# Patient Record
Sex: FEMALE | Race: AMERICAN INDIAN OR ALASKA NATIVE | NOT HISPANIC OR LATINO | Employment: UNEMPLOYED | URBAN - METROPOLITAN AREA
[De-identification: names, ages, dates, MRNs, and addresses within clinical notes are randomized per-mention and may not be internally consistent; named-entity substitution may affect disease eponyms.]

---

## 2018-10-16 PROBLEM — N93.9 ABNORMAL UTERINE BLEEDING (AUB): Status: ACTIVE | Noted: 2018-10-16

## 2018-10-28 PROBLEM — N93.9 ABNORMAL UTERINE BLEEDING (AUB): Chronic | Status: ACTIVE | Noted: 2018-10-16

## 2018-10-28 PROBLEM — F17.200 SMOKER: Chronic | Status: ACTIVE | Noted: 2018-10-28

## 2018-10-28 PROBLEM — N39.0 E. COLI UTI: Status: ACTIVE | Noted: 2018-10-28

## 2018-10-28 PROBLEM — B96.20 E. COLI UTI: Status: ACTIVE | Noted: 2018-10-28

## 2018-10-28 PROBLEM — F12.90 MARIJUANA USER: Status: ACTIVE | Noted: 2018-10-28

## 2018-10-29 PROBLEM — Z98.890 S/P DILATION AND CURETTAGE: Status: ACTIVE | Noted: 2018-10-29

## 2019-01-02 PROBLEM — N85.01 SIMPLE ENDOMETRIAL HYPERPLASIA: Status: ACTIVE | Noted: 2019-01-02

## 2019-01-07 PROBLEM — Z23 FLU VACCINE NEED: Status: ACTIVE | Noted: 2019-01-07

## 2019-01-07 PROBLEM — Z90.710 S/P HYSTERECTOMY: Status: ACTIVE | Noted: 2019-01-07

## 2021-01-19 ENCOUNTER — OFFICE VISIT (OUTPATIENT)
Dept: URGENT CARE | Facility: CLINIC | Age: 36
End: 2021-01-19
Payer: MEDICAID

## 2021-01-19 VITALS
SYSTOLIC BLOOD PRESSURE: 118 MMHG | HEART RATE: 80 BPM | DIASTOLIC BLOOD PRESSURE: 66 MMHG | WEIGHT: 130 LBS | OXYGEN SATURATION: 100 % | RESPIRATION RATE: 16 BRPM | HEIGHT: 61 IN | TEMPERATURE: 97 F | BODY MASS INDEX: 24.55 KG/M2

## 2021-01-19 DIAGNOSIS — R30.0 DYSURIA: ICD-10-CM

## 2021-01-19 DIAGNOSIS — N30.01 ACUTE CYSTITIS WITH HEMATURIA: Primary | ICD-10-CM

## 2021-01-19 LAB
BILIRUB UR QL STRIP: NEGATIVE
GLUCOSE UR QL STRIP: NEGATIVE
KETONES UR QL STRIP: NEGATIVE
LEUKOCYTE ESTERASE UR QL STRIP: POSITIVE
PH, POC UA: 7 (ref 5–8)
POC BLOOD, URINE: POSITIVE
POC NITRATES, URINE: NEGATIVE
PROT UR QL STRIP: NEGATIVE
SP GR UR STRIP: 1 (ref 1–1.03)
UROBILINOGEN UR STRIP-ACNC: NORMAL (ref 0.1–1.1)

## 2021-01-19 PROCEDURE — 99213 PR OFFICE/OUTPT VISIT, EST, LEVL III, 20-29 MIN: ICD-10-PCS | Mod: 25,S$GLB,, | Performed by: NURSE PRACTITIONER

## 2021-01-19 PROCEDURE — 99213 OFFICE O/P EST LOW 20 MIN: CPT | Mod: 25,S$GLB,, | Performed by: NURSE PRACTITIONER

## 2021-01-19 PROCEDURE — 81003 URINALYSIS AUTO W/O SCOPE: CPT | Mod: QW,S$GLB,, | Performed by: NURSE PRACTITIONER

## 2021-01-19 PROCEDURE — 81003 POCT URINALYSIS, DIPSTICK, AUTOMATED, W/O SCOPE: ICD-10-PCS | Mod: QW,S$GLB,, | Performed by: NURSE PRACTITIONER

## 2021-01-19 RX ORDER — NITROFURANTOIN 25; 75 MG/1; MG/1
100 CAPSULE ORAL 2 TIMES DAILY
Qty: 10 CAPSULE | Refills: 0 | Status: SHIPPED | OUTPATIENT
Start: 2021-01-19 | End: 2021-01-24

## 2021-05-04 ENCOUNTER — PATIENT MESSAGE (OUTPATIENT)
Dept: RESEARCH | Facility: HOSPITAL | Age: 36
End: 2021-05-04

## 2023-12-21 ENCOUNTER — ON-DEMAND VIRTUAL (OUTPATIENT)
Dept: URGENT CARE | Facility: CLINIC | Age: 38
End: 2023-12-21
Payer: MEDICAID

## 2023-12-21 DIAGNOSIS — J02.9 PHARYNGITIS, UNSPECIFIED ETIOLOGY: Primary | ICD-10-CM

## 2023-12-21 PROCEDURE — 99213 OFFICE O/P EST LOW 20 MIN: CPT | Mod: 95,,, | Performed by: PHYSICIAN ASSISTANT

## 2023-12-21 PROCEDURE — 99213 PR OFFICE/OUTPT VISIT, EST, LEVL III, 20-29 MIN: ICD-10-PCS | Mod: 95,,, | Performed by: PHYSICIAN ASSISTANT

## 2023-12-21 RX ORDER — PENICILLIN V POTASSIUM 500 MG/1
500 TABLET, FILM COATED ORAL 2 TIMES DAILY
Qty: 20 TABLET | Refills: 0 | Status: SHIPPED | OUTPATIENT
Start: 2023-12-21 | End: 2023-12-31

## 2023-12-22 NOTE — PROGRESS NOTES
Subjective:      Patient ID: Mima Wilkerson is a 38 y.o. female.    Vitals:  vitals were not taken for this visit.     Chief Complaint: Headache      Visit Type: TELE AUDIOVISUAL    Present with the patient at the time of consultation: TELEMED PRESENT WITH PATIENT: None    Past Medical History:   Diagnosis Date    Abnormal uterine bleeding (AUB)     E. coli UTI 10/28/2018    PONV (postoperative nausea and vomiting)      Past Surgical History:   Procedure Laterality Date    CERVICAL BIOPSY N/A 10/29/2018    Procedure: BIOPSY, CERVIX;  Surgeon: Laila Leiva MD;  Location: [facility];  Service: OB/GYN;  Laterality: N/A;  ECC    CERVICAL BIOPSY  W/ LOOP ELECTRODE EXCISION      CHOLECYSTECTOMY      COLPOSCOPY N/A 10/29/2018    Procedure: COLPOSCOPY;  Surgeon: Laila Leiva MD;  Location: [facility];  Service: OB/GYN;  Laterality: N/A;    CYSTOSCOPY N/A 01/07/2019    Procedure: CYSTOSCOPY;  Surgeon: Christiano Barkley MD;  Location: [facility];  Service: OB/GYN;  Laterality: N/A;    HYSTERECTOMY      HYSTEROSCOPY WITH DILATION AND CURETTAGE OF UTERUS N/A 10/29/2018    Procedure: HYSTEROSCOPY, WITH DILATION AND CURETTAGE OF UTERUS;  Surgeon: Laila Leiva MD;  Location: [facility];  Service: OB/GYN;  Laterality: N/A;    REYES CULDOPLASTY N/A 01/07/2019    Procedure: CULDOPLASTYAMY;  Surgeon: Christiano Barkley MD;  Location: [facility];  Service: OB/GYN;  Laterality: N/A;    SALPINGECTOMY Right 01/07/2019    Procedure: SALPINGECTOMY;  Surgeon: Christiano Barkley MD;  Location: [facility];  Service: OB/GYN;  Laterality: Right;  (Partial)  FIMBRIECTOMY    TONSILLECTOMY      TUBAL LIGATION      VAGINAL HYSTERECTOMY N/A 01/07/2019    Procedure: HYSTERECTOMY, VAGINAL;  Surgeon: Christiano Barkley MD;  Location: [facility];  Service: OB/GYN;  Laterality: N/A;     Review of patient's allergies indicates:   Allergen Reactions    Oxycontin [oxycodone] Swelling     Current Outpatient Medications on File Prior to Visit   Medication Sig  Dispense Refill    albuterol (PROVENTIL/VENTOLIN HFA) 90 mcg/actuation inhaler Inhale 1-2 puffs into the lungs every 6 (six) hours as needed for Wheezing or Shortness of Breath. (Patient not taking: Reported on 6/28/2023) 18 g 0    cholecalciferol, vitamin D3, 1,250 mcg (50,000 unit) capsule Take 50,000 Units by mouth every 7 days.      famotidine (PEPCID) 40 MG tablet Take 40 mg by mouth.      fluticasone propionate (FLONASE) 50 mcg/actuation nasal spray 1 spray to each nostril twice daily for 7 days, then may continue 1 spray to each nostril ONCE daily as needed for sinus congestion. (Patient not taking: Reported on 6/28/2023) 16 g 0    hydrOXYzine pamoate (VISTARIL) 25 MG Cap Take 1 capsule (25 mg total) by mouth every 6 (six) hours as needed (anxiety). 12 capsule 0    pantoprazole (PROTONIX) 20 MG tablet Take 1 tablet (20 mg total) by mouth once daily. 30 tablet 0     No current facility-administered medications on file prior to visit.     Family History   Problem Relation Age of Onset    Breast cancer Mother     Hypertension Father     Tongue cancer Father        Medications Ordered                [id number] - [place] - [address] AT [place] [address] [address] [address]    Telephone: [phone]   Fax: [phone]   Hours: Not open 24 hours                         E-Prescribed (1 of 1)              penicillin v potassium (VEETID) 500 MG tablet    Sig: Take 1 tablet (500 mg total) by mouth 2 (two) times daily. for 10 days       Start: 12/21/23     Quantity: 20 tablet Refills: 0                           Ohs Peq Odvv Intake    12/21/2023  8:13 PM CST - Filed by Patient   Describe your reason for todays visit Im taking theraflu but my head hurts bad and I'm really tired   What is your current physical address in the event of a medical emergency? [address] [id number]   Are you able to take your vital signs? No   Please attach any relevant images or files           HPI  37yo female presents with c/o headache, tired x 2-3 days, now with sore throat, fevers, chills, slight cough and nasal drainage.  Two days ago went to  with her children. She had negative flu, strep and covid at that time but one of her children had strep and the other had flu.     Denies POP.         Constitution: Positive for activity change, appetite change, chills, fatigue and fever.   HENT:  Positive for sore throat. Negative for ear pain and trouble swallowing.    Respiratory:  Positive for cough. Negative for chest tightness, shortness of breath and wheezing.    Gastrointestinal:  Negative for abdominal pain, nausea, vomiting and diarrhea.   Skin:  Negative for rash.   Neurological:  Positive for headaches.        Objective:   The physical exam was conducted virtually.  Physical Exam   Constitutional: She is oriented to person, place, and time.  Non-toxic appearance. She does not appear ill. No distress.   HENT:   Head: Normocephalic and atraumatic.   Mouth/Throat: Uvula is midline and mucous membranes are normal. Mucous membranes are moist. No trismus in the jaw. No uvula swelling. Oropharyngeal exudate and posterior oropharyngeal erythema present. No posterior oropharyngeal edema.   Neck: Neck supple.   Pulmonary/Chest: Effort normal. No respiratory distress.   Abdominal: Normal appearance.   Lymphadenopathy:     She has cervical adenopathy.   Neurological: She is alert and oriented to person, place, and time. Coordination normal.   Skin: Skin is dry, not diaphoretic, not pale and no rash.   Psychiatric: Her behavior is normal. Judgment and thought content normal.       Assessment:     1. Pharyngitis, unspecified etiology        Plan:       Pharyngitis, unspecified etiology    Other orders  -     penicillin v potassium (VEETID) 500 MG tablet; Take 1 tablet (500 mg total) by mouth 2 (two) times daily. for 10 days  Dispense: 20 tablet; Refill: 0      1. Push fluids and rest. Recommend warm salt  gargles or tea with honey for throat discomfort. May take Tylenol or Ibuprofen for fever or pain control. Antibiotic has been sent to your pharmacy, please take as directed.  2. If no improvement in 2-3 days please follow up at local urgent care for further evaluation or sooner if worsening pain, trouble or difficulty swallowing, unable to open mouth or other concerns.  3.  You must understand that you've received a Telehealth Urgent Care treatment only and that you may be released before all your medical problems are known or treated. You, the patient, will arrange for follow up care as instructed.    Patient voiced understanding and agrees to plan.

## 2023-12-29 ENCOUNTER — ON-DEMAND VIRTUAL (OUTPATIENT)
Dept: URGENT CARE | Facility: CLINIC | Age: 38
End: 2023-12-29
Payer: MEDICAID

## 2023-12-29 DIAGNOSIS — R05.1 ACUTE COUGH: Primary | ICD-10-CM

## 2023-12-29 DIAGNOSIS — B37.9 ANTIBIOTIC-INDUCED YEAST INFECTION: ICD-10-CM

## 2023-12-29 DIAGNOSIS — T36.95XA ANTIBIOTIC-INDUCED YEAST INFECTION: ICD-10-CM

## 2023-12-29 PROCEDURE — 99213 PR OFFICE/OUTPT VISIT, EST, LEVL III, 20-29 MIN: ICD-10-PCS | Mod: 95,,, | Performed by: NURSE PRACTITIONER

## 2023-12-29 PROCEDURE — 99213 OFFICE O/P EST LOW 20 MIN: CPT | Mod: 95,,, | Performed by: NURSE PRACTITIONER

## 2023-12-29 RX ORDER — FLUCONAZOLE 150 MG/1
150 TABLET ORAL DAILY
Qty: 2 TABLET | Refills: 0 | Status: SHIPPED | OUTPATIENT
Start: 2023-12-29 | End: 2023-12-31

## 2023-12-29 RX ORDER — BENZONATATE 100 MG/1
100 CAPSULE ORAL 3 TIMES DAILY PRN
Qty: 60 CAPSULE | Refills: 0 | Status: SHIPPED | OUTPATIENT
Start: 2023-12-29 | End: 2024-01-18

## 2023-12-29 NOTE — PROGRESS NOTES
Subjective:      Patient ID: Mima Wilkerson is a 38 y.o. female.    Vitals:  vitals were not taken for this visit.     Chief Complaint: Cough      Visit Type: TELE AUDIOVISUAL    Present with the patient at the time of consultation: TELEMED PRESENT WITH PATIENT: None    Past Medical History:   Diagnosis Date    Abnormal uterine bleeding (AUB)     E. coli UTI 10/28/2018    PONV (postoperative nausea and vomiting)      Past Surgical History:   Procedure Laterality Date    CERVICAL BIOPSY N/A 10/29/2018    Procedure: BIOPSY, CERVIX;  Surgeon: Laila Leiva MD;  Location: [facility];  Service: OB/GYN;  Laterality: N/A;  ECC    CERVICAL BIOPSY  W/ LOOP ELECTRODE EXCISION      CHOLECYSTECTOMY      COLPOSCOPY N/A 10/29/2018    Procedure: COLPOSCOPY;  Surgeon: Laila Leiva MD;  Location: [facility];  Service: OB/GYN;  Laterality: N/A;    CYSTOSCOPY N/A 01/07/2019    Procedure: CYSTOSCOPY;  Surgeon: Christiano Barkley MD;  Location: [facility];  Service: OB/GYN;  Laterality: N/A;    HYSTERECTOMY      HYSTEROSCOPY WITH DILATION AND CURETTAGE OF UTERUS N/A 10/29/2018    Procedure: HYSTEROSCOPY, WITH DILATION AND CURETTAGE OF UTERUS;  Surgeon: Laila Leiva MD;  Location: [facility];  Service: OB/GYN;  Laterality: N/A;    REYES CULDOPLASTY N/A 01/07/2019    Procedure: CULDOPLASTY, AMY;  Surgeon: Christiano Barkley MD;  Location: [facility];  Service: OB/GYN;  Laterality: N/A;    SALPINGECTOMY Right 01/07/2019    Procedure: SALPINGECTOMY;  Surgeon: Christiano Barkley MD;  Location: [facility];  Service: OB/GYN;  Laterality: Right;  (Partial)  FIMBRIECTOMY    TONSILLECTOMY      TUBAL LIGATION      VAGINAL HYSTERECTOMY N/A 01/07/2019    Procedure: HYSTERECTOMY, VAGINAL;  Surgeon: Chirstiano Barkley MD;  Location: [facility];  Service: OB/GYN;  Laterality: N/A;     Review of patient's allergies indicates:   Allergen Reactions    Oxycontin [oxycodone] Swelling     Current Outpatient Medications on File Prior to Visit   Medication Sig Dispense  Refill    famotidine (PEPCID) 40 MG tablet Take 40 mg by mouth.      penicillin v potassium (VEETID) 500 MG tablet Take 1 tablet (500 mg total) by mouth 2 (two) times daily. for 10 days 20 tablet 0     No current facility-administered medications on file prior to visit.     Family History   Problem Relation Age of Onset    Breast cancer Mother     Hypertension Father     Tongue cancer Father        Medications Ordered                [id number] - [place] - [address] AT [address] [place]   [address] [place] [address]    Telephone: [phone]   Fax: [phone]   Hours: Not open 24 hours                         E-Prescribed (2 of 2)              benzonatate (TESSALON) 100 MG capsule    Sig: Take 1 capsule (100 mg total) by mouth 3 (three) times daily as needed for Cough. May take 1-2 caps 3 times daily as needed.       Start: 12/29/23     Quantity: 60 capsule Refills: 0                         fluconazole (DIFLUCAN) 150 MG Tab    Sig: Take 1 tablet (150 mg total) by mouth once daily. Take 1 tablet as a single dose. If symptoms persist, may repeat a second dose in 72 hours. for 2 days       Start: 12/29/23     Quantity: 2 tablet Refills: 0                           Ohs Peq Odvv Intake    12/29/2023 10:18 AM CST - Filed by Patient   Describe your reason for todays visit I started with a cough yesterday and I'm already on antibiotics   What is your current physical address in the event of a medical emergency? [address] [facility] [address]   Are you able to take your vital signs? Yes   Systolic Blood Pressure: 112   Diastolic Blood Pressure: 71   Weight: 140   Height: 5   Pulse: 100   Temperature:    Respiration rate:    Pulse Oxygen:    Please attach any relevant images or files          +sick contacts at home. Seen last week and given antibiotics, still taking. Yesterday started with cough and has now developed yeast symptoms.    Cough  Pertinent negatives include no shortness of breath or  wheezing.       Respiratory:  Positive for cough and sputum production. Negative for shortness of breath and wheezing.    Genitourinary:  Negative for vaginal pain, vaginal discharge, vaginal odor and genital sore.   Allergic/Immunologic: Positive for itching (vaginal).        Objective:   The physical exam was conducted virtually.  Physical Exam   Constitutional: She is oriented to person, place, and time. She does not appear ill. No distress.   HENT:   Head: Normocephalic and atraumatic.   Nose: Nose normal.   Eyes: Extraocular movement intact   Pulmonary/Chest: Effort normal.   Abdominal: Normal appearance.   Musculoskeletal: Normal range of motion.         General: Normal range of motion.   Neurological: no focal deficit. She is alert and oriented to person, place, and time.   Psychiatric: Her behavior is normal. Mood normal.   Vitals reviewed.      Assessment:     1. Acute cough    2. Antibiotic-induced yeast infection        Plan:   Patient encouraged to monitor symptoms closely and instructed to follow-up for new or worsening symptoms. Further, in-person, evaluation may be necessary for continued treatment. Please follow up with your primary care doctor or specialist as needed. Verbally discussed plan. Patient confirms understanding and is in agreement with treatment and plan.     You must understand that you've received a Virtual Care evaluation only and that you may be released before all your medical problems are known or treated. You, the patient, will arrange for follow up care as instructed.      Acute cough  -     benzonatate (TESSALON) 100 MG capsule; Take 1 capsule (100 mg total) by mouth 3 (three) times daily as needed for Cough. May take 1-2 caps 3 times daily as needed.  Dispense: 60 capsule; Refill: 0    Antibiotic-induced yeast infection  -     fluconazole (DIFLUCAN) 150 MG Tab; Take 1 tablet (150 mg total) by mouth once daily. Take 1 tablet as a single dose. If symptoms persist, may repeat a  second dose in 72 hours. for 2 days  Dispense: 2 tablet; Refill: 0             Patient Instructions   OVER THE COUNTER RECOMMENDATIONS/SUGGESTIONS (IF NO CONTRAINDICATIONS).     ·         Make sure to stay well hydrated.     ·         Use Nasal Saline to mechanically move any post nasal drip from your eustachian tube or from the back of your throat.     ·         Use warm saltwater gargles to ease your throat pain. Warm saltwater gargles as needed for sore throat-  1/2 tsp salt to 1 cup warm water, gargle as desired. Warm fluids tend to relieve a sore throat.     .         Throat lozenges, Chloraseptic spray or other over the counter treatments are ok to use as well. Use as directed.     ·         Use an antihistamine such as Claritin, Zyrtec or Allegra to dry you out.     ·         Use pseudoephedrine (behind the counter) to decongest. Pseudoephedrine  30 mg up to 240 mg /day. It can raise your blood pressure and give you palpitations.     ·         Use Mucinex (guaifenesin) to break up mucous up to 2400mg/day to loosen any mucous.     ·         The Mucinex DM pill has a cough suppressant that can be sedating. It can be used at night to stop the tickle at the back of your throat.     ·         You can use Mucinex D (it has guaifenesin and a high dose of pseudoephedrine) in the mornings to help decongest.     ·         Use Afrin (oxymetazoline) in each nare for no longer than 3 days, as it is addictive. It can also dry out your mucous membranes and cause elevated blood pressure. This is especially useful if you are flying.     ·         Use Flonase 1-2 sprays/nostril per day. It is a local acting steroid nasal spray, if you develop a bloody nose, stop using the medication immediately.     ·         Sometimes Nyquil at night is beneficial to help you get some rest, however it is sedating, and it does have an antihistamine, and Tylenol.     ·         Honey is a natural cough suppressant that can be used.     ·          Tylenol up to 4,000 mg a day is safe for short periods and can be used for body aches, pain, and fever. However, in high doses and prolonged use it can cause liver irritation.     ·         Ibuprofen is a non-steroidal anti-inflammatory that can be used for body aches, pain, and fever. However, it can also cause stomach irritation if overused.

## 2024-01-18 ENCOUNTER — ON-DEMAND VIRTUAL (OUTPATIENT)
Dept: URGENT CARE | Facility: CLINIC | Age: 39
End: 2024-01-18
Payer: MEDICAID

## 2024-01-18 DIAGNOSIS — N30.90 CYSTITIS: Primary | ICD-10-CM

## 2024-01-18 PROCEDURE — 99212 OFFICE O/P EST SF 10 MIN: CPT | Mod: 95,,, | Performed by: INTERNAL MEDICINE

## 2024-01-18 RX ORDER — NITROFURANTOIN 25; 75 MG/1; MG/1
100 CAPSULE ORAL 2 TIMES DAILY
Qty: 10 CAPSULE | Refills: 0 | Status: SHIPPED | OUTPATIENT
Start: 2024-01-18 | End: 2024-01-23

## 2024-01-18 NOTE — PROGRESS NOTES
Subjective:      Patient ID: Mima Wilkerson is a 38 y.o. female.    Vitals:  vitals were not taken for this visit.     Chief Complaint: Urinary Tract Infection      Visit Type: TELE AUDIOVISUAL    Present with the patient at the time of consultation: TELEMED PRESENT WITH PATIENT: None    Past Medical History:   Diagnosis Date    Abnormal uterine bleeding (AUB)     E. coli UTI 10/28/2018    PONV (postoperative nausea and vomiting)      Past Surgical History:   Procedure Laterality Date    CERVICAL BIOPSY N/A 10/29/2018    Procedure: BIOPSY, CERVIX;  Surgeon: Laila Leiva MD;  Location: [facility];  Service: OB/GYN;  Laterality: N/A;  ECC    CERVICAL BIOPSY  W/ LOOP ELECTRODE EXCISION      CHOLECYSTECTOMY      COLPOSCOPY N/A 10/29/2018    Procedure: COLPOSCOPY;  Surgeon: Laila Leiva MD;  Location: [facility];  Service: OB/GYN;  Laterality: N/A;    CYSTOSCOPY N/A 01/07/2019    Procedure: CYSTOSCOPY;  Surgeon: Christiano Barkley MD;  Location: [facility];  Service: OB/GYN;  Laterality: N/A;    HYSTERECTOMY      HYSTEROSCOPY WITH DILATION AND CURETTAGE OF UTERUS N/A 10/29/2018    Procedure: HYSTEROSCOPY, WITH DILATION AND CURETTAGE OF UTERUS;  Surgeon: Laila Leiva MD;  Location: [facility];  Service: OB/GYN;  Laterality: N/A;    REYES CULDOPLASTY N/A 01/07/2019    Procedure: CULDOPLASTY, AMY;  Surgeon: Christiano Barkley MD;  Location: [facility];  Service: OB/GYN;  Laterality: N/A;    SALPINGECTOMY Right 01/07/2019    Procedure: SALPINGECTOMY;  Surgeon: Christiano Barkley MD;  Location: [facility];  Service: OB/GYN;  Laterality: Right;  (Partial)  FIMBRIECTOMY    TONSILLECTOMY      TUBAL LIGATION      VAGINAL HYSTERECTOMY N/A 01/07/2019    Procedure: HYSTERECTOMY, VAGINAL;  Surgeon: Christiano Barkley MD;  Location: [facility];  Service: OB/GYN;  Laterality: N/A;     Review of patient's allergies indicates:   Allergen Reactions    Oxycontin [oxycodone] Swelling     Current Outpatient Medications on File Prior to Visit    Medication Sig Dispense Refill    benzonatate (TESSALON) 100 MG capsule Take 1 capsule (100 mg total) by mouth 3 (three) times daily as needed for Cough. May take 1-2 caps 3 times daily as needed. 60 capsule 0    famotidine (PEPCID) 40 MG tablet Take 40 mg by mouth.       No current facility-administered medications on file prior to visit.     Family History   Problem Relation Age of Onset    Breast cancer Mother     Hypertension Father     Tongue cancer Father        Medications Ordered                [id number] - [place] - [address] AT [facility]   [address] [place] [address]    Telephone: [phone]   Fax: [phone]   Hours: Not open 24 hours                         E-Prescribed (1 of 1)              nitrofurantoin, macrocrystal-monohydrate, (MACROBID) 100 MG capsule    Sig: Take 1 capsule (100 mg total) by mouth 2 (two) times daily. for 5 days       Start: 1/18/24     Quantity: 10 capsule Refills: 0                           Ohs Peq Odvv Intake    1/18/2024  2:38 PM CST - Filed by Patient   What is your current physical address in the event of a medical emergency? [address] [facility].[id number]   Are you able to take your vital signs? No   Please attach any relevant images or files          In [place] via video conference.     39 y/o woman with a 1 day history of urgency and feeling that she cannot empty her urine all the way. No fever. No frequency. She is not allergic to any antibiotics.     Urinary Tract Infection   Associated symptoms include urgency. Pertinent negatives include no flank pain, nausea or vomiting.       Constitution: Negative for fever.   Gastrointestinal:  Negative for nausea and vomiting.   Genitourinary:  Positive for urgency. Negative for flank pain.        Objective:   The physical exam was conducted virtually.  Physical Exam   Constitutional:  Non-toxic appearance. No distress.   HENT:   Nose: No congestion.   Pulmonary/Chest: No stridor. No  respiratory distress.   Neurological: She is alert.     No other pertinent findings on examination.   Assessment:     1. Cystitis        Plan:       Cystitis    Other orders  -     nitrofurantoin, macrocrystal-monohydrate, (MACROBID) 100 MG capsule; Take 1 capsule (100 mg total) by mouth 2 (two) times daily. for 5 days  Dispense: 10 capsule; Refill: 0      If no better in a day or two go in for an in person visit.

## 2024-02-06 ENCOUNTER — PATIENT MESSAGE (OUTPATIENT)
Dept: URGENT CARE | Facility: CLINIC | Age: 39
End: 2024-02-06
Payer: MEDICAID

## 2024-03-22 ENCOUNTER — ON-DEMAND VIRTUAL (OUTPATIENT)
Dept: URGENT CARE | Facility: CLINIC | Age: 39
End: 2024-03-22
Payer: MEDICAID

## 2024-03-22 DIAGNOSIS — J02.9 ACUTE PHARYNGITIS, UNSPECIFIED ETIOLOGY: Primary | ICD-10-CM

## 2024-03-22 PROCEDURE — 99212 OFFICE O/P EST SF 10 MIN: CPT | Mod: 95,,, | Performed by: NURSE PRACTITIONER

## 2024-03-22 NOTE — PROGRESS NOTES
Subjective:      Patient ID: Mima Wilkerson is a 39 y.o. female.    Vitals:  vitals were not taken for this visit.     Chief Complaint: Sore Throat      Visit Type: TELE AUDIOVISUAL    Present with the patient at the time of consultation: TELEMED PRESENT WITH PATIENT: None, at home    Past Medical History:   Diagnosis Date    Abnormal uterine bleeding (AUB)     E. coli UTI 10/28/2018    PONV (postoperative nausea and vomiting)      Past Surgical History:   Procedure Laterality Date    CERVICAL BIOPSY N/A 10/29/2018    Procedure: BIOPSY, CERVIX;  Surgeon: Laila Leiva MD;  Location: [facility];  Service: OB/GYN;  Laterality: N/A;  ECC    CERVICAL BIOPSY  W/ LOOP ELECTRODE EXCISION      CHOLECYSTECTOMY      COLPOSCOPY N/A 10/29/2018    Procedure: COLPOSCOPY;  Surgeon: Laila Leiva MD;  Location: [facility];  Service: OB/GYN;  Laterality: N/A;    CYSTOSCOPY N/A 01/07/2019    Procedure: CYSTOSCOPY;  Surgeon: Christiano Barkley MD;  Location: [facility];  Service: OB/GYN;  Laterality: N/A;    HYSTERECTOMY      HYSTEROSCOPY WITH DILATION AND CURETTAGE OF UTERUS N/A 10/29/2018    Procedure: HYSTEROSCOPY, WITH DILATION AND CURETTAGE OF UTERUS;  Surgeon: Laila Leiva MD;  Location: [facility];  Service: OB/GYN;  Laterality: N/A;    REYES CULDOPLASTY N/A 01/07/2019    Procedure: CULDOPLASTY, AMY;  Surgeon: Christiano Barkley MD;  Location: [facility];  Service: OB/GYN;  Laterality: N/A;    SALPINGECTOMY Right 01/07/2019    Procedure: SALPINGECTOMY;  Surgeon: Christiano Barkley MD;  Location: [facility];  Service: OB/GYN;  Laterality: Right;  (Partial)  FIMBRIECTOMY    TONSILLECTOMY      TUBAL LIGATION      VAGINAL HYSTERECTOMY N/A 01/07/2019    Procedure: HYSTERECTOMY, VAGINAL;  Surgeon: Christiano Barkley MD;  Location: [facility];  Service: OB/GYN;  Laterality: N/A;     Review of patient's allergies indicates:   Allergen Reactions    Oxycodone-acetaminophen     Oxycontin [oxycodone] Swelling     Current Outpatient Medications on File  Prior to Visit   Medication Sig Dispense Refill    famotidine (PEPCID) 40 MG tablet Take 40 mg by mouth.       No current facility-administered medications on file prior to visit.     Family History   Problem Relation Age of Onset    Breast cancer Mother     Hypertension Father     Tongue cancer Father            Ohs Peq Odvv Intake    3/22/2024  9:58 AM CDT - Filed by Patient   What is your current physical address in the event of a medical emergency? [address] [facility].[id number]   Are you able to take your vital signs? No   Please attach any relevant images or files          Sore throat for 3 days. Took allergy medications with little relief. No sick contacts.    Sore Throat   Associated symptoms include coughing, ear pain and shortness of breath (+smoker). Pertinent negatives include no trouble swallowing.       Constitution: Negative for chills and fever.   HENT:  Positive for ear pain, postnasal drip, sinus pressure and sore throat. Negative for sinus pain, trouble swallowing and voice change.    Neck: Positive for painful lymph nodes (right).   Respiratory:  Positive for cough and shortness of breath (+smoker). Negative for wheezing.    Allergic/Immunologic: Positive for seasonal allergies.   Hematologic/Lymphatic: Positive for swollen lymph nodes (right).        Objective:   The physical exam was conducted virtually.  Physical Exam   Constitutional: She is oriented to person, place, and time. She does not appear ill. No distress.   HENT:   Head: Normocephalic and atraumatic.   Nose: Nose normal.   Eyes: Extraocular movement intact   Pulmonary/Chest: Effort normal.   Abdominal: Normal appearance.   Musculoskeletal: Normal range of motion.         General: Normal range of motion.   Neurological: no focal deficit. She is alert and oriented to person, place, and time.   Psychiatric: Her behavior is normal. Mood normal.   Vitals reviewed.      Assessment:     1. Acute pharyngitis, unspecified etiology         Plan:   Patient encouraged to monitor symptoms closely and instructed to follow-up for new or worsening symptoms. Further, in-person, evaluation may be necessary for continued treatment. Please follow up with your primary care doctor or specialist as needed. Verbally discussed plan. Patient confirms understanding and is in agreement with treatment and plan.     You must understand that you've received a [facility] evaluation only and that you may be released before all your medical problems are known or treated. You, the patient, will arrange for follow up care as instructed.      Acute pharyngitis, unspecified etiology        Patient Instructions   OVER THE COUNTER RECOMMENDATIONS/SUGGESTIONS (IF NO CONTRAINDICATIONS).     ·         Make sure to stay well hydrated.     ·         Use Nasal Saline to mechanically move any post nasal drip from your eustachian tube or from the back of your throat.     ·         Use warm saltwater gargles to ease your throat pain. Warm saltwater gargles as needed for sore throat-  1/2 tsp salt to 1 cup warm water, gargle as desired. Warm fluids tend to relieve a sore throat.     .         Throat lozenges, Chloraseptic spray or other over the counter treatments are ok to use as well. Use as directed.     ·         Use an antihistamine such as Claritin, Zyrtec or Allegra to dry you out.     ·         Use pseudoephedrine (behind the counter) to decongest. Pseudoephedrine  30 mg up to 240 mg /day. It can raise your blood pressure and give you palpitations.     ·         Use Mucinex (guaifenesin) to break up mucous up to 2400mg/day to loosen any mucous.     ·         The Mucinex DM pill has a cough suppressant that can be sedating. It can be used at night to stop the tickle at the back of your throat.     ·         You can use Mucinex D (it has guaifenesin and a high dose of pseudoephedrine) in the mornings to help decongest.     ·         Use Afrin (oxymetazoline) in each nare for  no longer than 3 days, as it is addictive. It can also dry out your mucous membranes and cause elevated blood pressure. This is especially useful if you are flying.     ·         Use Flonase 1-2 sprays/nostril per day. It is a local acting steroid nasal spray, if you develop a bloody nose, stop using the medication immediately.     ·         Sometimes Nyquil at night is beneficial to help you get some rest, however it is sedating, and it does have an antihistamine, and Tylenol.     ·         Honey is a natural cough suppressant that can be used.     ·         Tylenol up to 4,000 mg a day is safe for short periods and can be used for body aches, pain, and fever. However, in high doses and prolonged use it can cause liver irritation.     ·         Ibuprofen is a non-steroidal anti-inflammatory that can be used for body aches, pain, and fever. However, it can also cause stomach irritation if overused.

## 2024-05-31 ENCOUNTER — ON-DEMAND VIRTUAL (OUTPATIENT)
Dept: URGENT CARE | Facility: CLINIC | Age: 39
End: 2024-05-31
Payer: MEDICAID

## 2024-05-31 DIAGNOSIS — T63.481A INSECT STINGS, ACCIDENTAL OR UNINTENTIONAL, INITIAL ENCOUNTER: Primary | ICD-10-CM

## 2024-05-31 PROCEDURE — 99213 OFFICE O/P EST LOW 20 MIN: CPT | Mod: 95,,, | Performed by: NURSE PRACTITIONER

## 2024-05-31 RX ORDER — PREDNISONE 20 MG/1
20 TABLET ORAL DAILY
Qty: 5 TABLET | Refills: 0 | Status: SHIPPED | OUTPATIENT
Start: 2024-05-31 | End: 2024-06-05

## 2024-05-31 NOTE — PROGRESS NOTES
Subjective:      Patient ID: Mima Wilkerson is a 39 y.o. female.    Vitals:  vitals were not taken for this visit.     Chief Complaint: Insect Bite      Visit Type: TELE AUDIOVISUAL    Present with the patient at the time of consultation: TELEMED PRESENT WITH PATIENT: None        Past Medical History:   Diagnosis Date    Abnormal uterine bleeding (AUB)     E. coli UTI 10/28/2018    PONV (postoperative nausea and vomiting)      Past Surgical History:   Procedure Laterality Date    CERVICAL BIOPSY N/A 10/29/2018    Procedure: BIOPSY, CERVIX;  Surgeon: Laila Leiva MD;  Location: [facility];  Service: OB/GYN;  Laterality: N/A;  ECC    CERVICAL BIOPSY  W/ LOOP ELECTRODE EXCISION      CHOLECYSTECTOMY      COLPOSCOPY N/A 10/29/2018    Procedure: COLPOSCOPY;  Surgeon: Laila Leiva MD;  Location: [facility];  Service: OB/GYN;  Laterality: N/A;    CYSTOSCOPY N/A 01/07/2019    Procedure: CYSTOSCOPY;  Surgeon: Christiano Barkley MD;  Location: [facility];  Service: OB/GYN;  Laterality: N/A;    HYSTERECTOMY      HYSTEROSCOPY WITH DILATION AND CURETTAGE OF UTERUS N/A 10/29/2018    Procedure: HYSTEROSCOPY, WITH DILATION AND CURETTAGE OF UTERUS;  Surgeon: Laila Leiva MD;  Location: [facility];  Service: OB/GYN;  Laterality: N/A;    REYES CULDOPLASTY N/A 01/07/2019    Procedure: CULDOPLASTY, AMY;  Surgeon: Christiano Barkley MD;  Location: [facility];  Service: OB/GYN;  Laterality: N/A;    SALPINGECTOMY Right 01/07/2019    Procedure: SALPINGECTOMY;  Surgeon: Christiano Barkley MD;  Location: [facility];  Service: OB/GYN;  Laterality: Right;  (Partial)  FIMBRIECTOMY    TONSILLECTOMY      TUBAL LIGATION      VAGINAL HYSTERECTOMY N/A 01/07/2019    Procedure: HYSTERECTOMY, VAGINAL;  Surgeon: Christiano Barkley MD;  Location: [facility];  Service: OB/GYN;  Laterality: N/A;     Review of patient's allergies indicates:   Allergen Reactions    Oxycodone-acetaminophen     Oxycontin [oxycodone] Swelling     Current Outpatient Medications on File  Prior to Visit   Medication Sig Dispense Refill    famotidine (PEPCID) 40 MG tablet Take 40 mg by mouth.       No current facility-administered medications on file prior to visit.     Family History   Problem Relation Name Age of Onset    Breast cancer Mother      Hypertension Father      Tongue cancer Father         Medications Ordered                [id number] [place] - [address] [facility]   [address] [address]    Telephone: [phone]   Fax: [phone]   Hours: Not open 24 hours                         E-Prescribed (1 of 1)              predniSONE (DELTASONE) 20 MG tablet    Sig: Take 1 tablet (20 mg total) by mouth once daily. for 5 days       Start: 5/31/24     Quantity: 5 tablet Refills: 0                           Ohs Peq Odvv Intake    5/31/2024  3:33 PM CDT - Filed by Patient   What is your current physical address in the event of a medical emergency? [address] [place] [address] [address] [facility] [address]   Are you able to take your vital signs? No   Please attach any relevant images or files          38 yo female with c/o insect bite to left leg three days ago. She states now with redness down leg to ankle and mild swelling. She has not tried anything.         Constitution: Negative.   HENT: Negative.     Cardiovascular: Negative.    Respiratory: Negative.     Gastrointestinal: Negative.    Endocrine: negative.   Genitourinary: Negative.  Negative for frequency and urgency.   Musculoskeletal: Negative.    Skin: Negative.  Positive for erythema.   Allergic/Immunologic: Negative.    Neurological: Negative.    Hematologic/Lymphatic: Negative.    Psychiatric/Behavioral: Negative.          Objective:   The physical exam was conducted virtually.  LOCATION OF PATIENT home  Physical Exam   Constitutional: She is oriented to person, place, and time. She appears well-developed.   HENT:   Head: Normocephalic and atraumatic.   Ears:   Right Ear: Hearing, tympanic membrane and external ear normal.    Left Ear: Hearing, tympanic membrane and external ear normal.   Nose: Nose normal.   Mouth/Throat: Uvula is midline, oropharynx is clear and moist and mucous membranes are normal.   Eyes: Conjunctivae and EOM are normal. Pupils are equal, round, and reactive to light.   Neck: Neck supple.   Cardiovascular: Normal rate.   Pulmonary/Chest: Effort normal and breath sounds normal.   Musculoskeletal: Normal range of motion.         General: Normal range of motion.   Neurological: She is alert and oriented to person, place, and time.   Skin: Skin is warm and rash. erythema        Psychiatric: Her behavior is normal. Thought content normal.   Nursing note and vitals reviewed.      Assessment:     1. Insect stings, accidental or unintentional, initial encounter        Plan:   Hydration and Rest: Make sure to drink plenty of fluids and get enough rest to support your body's healing process.  Monitoring and Seeking Help: Monitor your condition closely and seek medical attention if you experience any concerns or if your condition worsens.  Over-the-Counter Medications:  Take over-the-counter Pepcid or Zantac as directed for the next 24-72 hours if needed for relief.  If you received a steroid shot and have been prescribed oral steroids like Prednisone or a Medrol Dose Pack, start taking them the following day.  For localized reactions, it's okay to use over-the-counter topical creams like Cortaid and cool compresses to reduce itching.  Take Claritin, Zyrtec, or Allegra twice a day for allergy relief. You can also use Benadryl or Hydroxyzine as needed for itching, but be cautious of potential drowsiness and avoid driving or operating heavy machinery while taking these medications.  Future Preparedness: Keep Benadryl or an Epi-pen with you if prescribed, for future allergy management.  Following these instructions diligently can help manage your symptoms effectively and promote your overall well-being. If you have any  questions or concerns about your treatment plan, don't hesitate to reach out to your healthcare provider for clarification and guidance. They can offer personalized advice based on your specific needs and medical history.       Insect stings, accidental or unintentional, initial encounter  -     predniSONE (DELTASONE) 20 MG tablet; Take 1 tablet (20 mg total) by mouth once daily. for 5 days  Dispense: 5 tablet; Refill: 0

## 2024-08-14 ENCOUNTER — ON-DEMAND VIRTUAL (OUTPATIENT)
Dept: URGENT CARE | Facility: CLINIC | Age: 39
End: 2024-08-14
Payer: MEDICAID

## 2024-08-14 DIAGNOSIS — F41.9 ANXIETY: ICD-10-CM

## 2024-08-14 DIAGNOSIS — R11.10 VOMITING, UNSPECIFIED VOMITING TYPE, UNSPECIFIED WHETHER NAUSEA PRESENT: Primary | ICD-10-CM

## 2024-08-14 PROCEDURE — 99213 OFFICE O/P EST LOW 20 MIN: CPT | Mod: 95,S$GLB,, | Performed by: NURSE PRACTITIONER

## 2024-08-14 RX ORDER — HYDROXYZINE HYDROCHLORIDE 25 MG/1
25 TABLET, FILM COATED ORAL 3 TIMES DAILY PRN
Qty: 30 TABLET | Refills: 0 | Status: SHIPPED | OUTPATIENT
Start: 2024-08-14

## 2024-08-14 RX ORDER — ONDANSETRON 4 MG/1
4 TABLET, ORALLY DISINTEGRATING ORAL EVERY 8 HOURS PRN
Qty: 12 TABLET | Refills: 0 | Status: SHIPPED | OUTPATIENT
Start: 2024-08-14

## 2024-08-14 NOTE — PROGRESS NOTES
Subjective:      Patient ID: Mima Wilkerson is a 39 y.o. female.    Vitals:  vitals were not taken for this visit.     Chief Complaint: Emesis      Visit Type: TELE AUDIOVISUAL    Present with the patient at the time of consultation: TELEMED PRESENT WITH PATIENT: colleague/friend    Past Medical History:   Diagnosis Date    Abnormal uterine bleeding (AUB)     E. coli UTI 10/28/2018    PONV (postoperative nausea and vomiting)      Past Surgical History:   Procedure Laterality Date    CERVICAL BIOPSY N/A 10/29/2018    Procedure: BIOPSY, CERVIX;  Surgeon: Laila Leiva MD;  Location: [facility];  Service: OB/GYN;  Laterality: N/A;  ECC    CERVICAL BIOPSY  W/ LOOP ELECTRODE EXCISION      CHOLECYSTECTOMY      COLPOSCOPY N/A 10/29/2018    Procedure: COLPOSCOPY;  Surgeon: Laila Leiva MD;  Location: [facility];  Service: OB/GYN;  Laterality: N/A;    CYSTOSCOPY N/A 01/07/2019    Procedure: CYSTOSCOPY;  Surgeon: Christiano Barkley MD;  Location: [facility];  Service: OB/GYN;  Laterality: N/A;    HYSTERECTOMY      HYSTEROSCOPY WITH DILATION AND CURETTAGE OF UTERUS N/A 10/29/2018    Procedure: HYSTEROSCOPY, WITH DILATION AND CURETTAGE OF UTERUS;  Surgeon: Laila Leiva MD;  Location: [facility];  Service: OB/GYN;  Laterality: N/A;    REYES CULDOPLASTY N/A 01/07/2019    Procedure: CULDOPLASTY, AMY;  Surgeon: Christiano Barkley MD;  Location: [facility];  Service: OB/GYN;  Laterality: N/A;    SALPINGECTOMY Right 01/07/2019    Procedure: SALPINGECTOMY;  Surgeon: Christiano Barkley MD;  Location: [facility];  Service: OB/GYN;  Laterality: Right;  (Partial)  FIMBRIECTOMY    TONSILLECTOMY      TUBAL LIGATION      VAGINAL HYSTERECTOMY N/A 01/07/2019    Procedure: HYSTERECTOMY, VAGINAL;  Surgeon: Christiano Barkley MD;  Location: [facility];  Service: OB/GYN;  Laterality: N/A;     Review of patient's allergies indicates:   Allergen Reactions    Oxycodone-acetaminophen     Oxycontin [oxycodone] Swelling     Current Outpatient Medications on File  Prior to Visit   Medication Sig Dispense Refill    famotidine (PEPCID) 40 MG tablet Take 40 mg by mouth.       No current facility-administered medications on file prior to visit.     Family History   Problem Relation Name Age of Onset    Breast cancer Mother      Hypertension Father      Tongue cancer Father         Medications Ordered                [id number] [place] - [address] [facility]   [address] [address]    Telephone: [phone]   Fax: [phone]   Hours: Not open 24 hours                         E-Prescribed (2 of 2)              hydrOXYzine HCL (ATARAX) 25 MG tablet    Sig: Take 1 tablet (25 mg total) by mouth 3 (three) times daily as needed for Anxiety.       Start: 8/14/24     Quantity: 30 tablet Refills: 0                         ondansetron (ZOFRAN-ODT) 4 MG TbDL    Sig: Take 1 tablet (4 mg total) by mouth every 8 (eight) hours as needed (nausea).       Start: 8/14/24     Quantity: 12 tablet Refills: 0                           Ohs Peq Odvv Intake    8/14/2024  3:05 PM CDT - Filed by Patient   What is your current physical address in the event of a medical emergency? [address] [place], [place].[address]   Are you able to take your vital signs? No   Please attach any relevant images or files          Patient states she recently quit smoking weed and has been experiencing nausea, vomiting and insomnia.   No other symptoms. She is requesting medication to help her sleep and to help nausea.   Has an appt with psych soon. Denies any SI or HI.     Emesis         Gastrointestinal:  Positive for vomiting.        Objective:   The physical exam was conducted virtually.  Physical Exam   Constitutional: She is oriented to person, place, and time. No distress.   HENT:   Head: Normocephalic and atraumatic.   Mouth/Throat: Oropharynx is clear and moist and mucous membranes are normal.   Eyes: Conjunctivae are normal. No scleral icterus.   Pulmonary/Chest: Effort normal. No  respiratory distress.   Musculoskeletal: Normal range of motion.         General: Normal range of motion.   Neurological: She is alert and oriented to person, place, and time.   Skin: Skin is not diaphoretic.   Psychiatric: Her behavior is normal. Judgment and thought content normal.   Vitals reviewed.      Assessment:     1. Vomiting, unspecified vomiting type, unspecified whether nausea present    2. Anxiety        Plan:       Vomiting, unspecified vomiting type, unspecified whether nausea present  -     ondansetron (ZOFRAN-ODT) 4 MG TbDL; Take 1 tablet (4 mg total) by mouth every 8 (eight) hours as needed (nausea).  Dispense: 12 tablet; Refill: 0    Anxiety  -     hydrOXYzine HCL (ATARAX) 25 MG tablet; Take 1 tablet (25 mg total) by mouth 3 (three) times daily as needed for Anxiety.  Dispense: 30 tablet; Refill: 0                  Thank you for choosing [facility]!    Our goal in the [facility] is to always provide outstanding medical care. You may receive a survey by mail or e-mail in the next week regarding your experience today. We would greatly appreciate you completing and returning the survey. Your feedback provides us with a way to recognize our staff who provide very good care, and it helps us learn how to improve when your experience was below our aspiration of excellence.         We appreciate you trusting us with your medical care. We hope you feel better soon. We will be happy to take care of you for all of your future medical needs.    You must understand that you've received an Urgent Care treatment only and that you may be released before all your medical problems are known or treated. You, the patient, will arrange for follow up care as instructed.    Follow up with your PCP or specialty clinic as directed in the next 1-2 weeks if not improved or as needed.  You can call [phone] to schedule an appointment with the appropriate provider.    If your  condition worsens we recommend that you receive another evaluation in person, with your primary care provider, urgent care or at the emergency room immediately or contact your primary medical clinics after hours call service to discuss your concerns.

## 2025-02-07 ENCOUNTER — ON-DEMAND VIRTUAL (OUTPATIENT)
Dept: URGENT CARE | Facility: CLINIC | Age: 40
End: 2025-02-07
Payer: MEDICAID

## 2025-02-07 DIAGNOSIS — K04.7 TOOTH INFECTION: Primary | ICD-10-CM

## 2025-02-07 PROCEDURE — 98005 SYNCH AUDIO-VIDEO EST LOW 20: CPT | Mod: 95,,, | Performed by: PHYSICIAN ASSISTANT

## 2025-02-07 RX ORDER — AMOXICILLIN 500 MG/1
500 CAPSULE ORAL EVERY 12 HOURS
Qty: 20 CAPSULE | Refills: 0 | Status: SHIPPED | OUTPATIENT
Start: 2025-02-07 | End: 2025-02-17

## 2025-02-07 NOTE — PROGRESS NOTES
Subjective:      Patient ID: Mima Wilkerson is a 39 y.o. female.    Vitals:  vitals were not taken for this visit.     Chief Complaint: Dental Pain      Visit Type: TELE AUDIOVISUAL    Patient Location: Home     Present with the patient at the time of consultation: TELEMED PRESENT WITH PATIENT: None    Past Medical History:   Diagnosis Date    Abnormal uterine bleeding (AUB)     E. coli UTI 10/28/2018    PONV (postoperative nausea and vomiting)      Past Surgical History:   Procedure Laterality Date    CERVICAL BIOPSY N/A 10/29/2018    Procedure: BIOPSY, CERVIX;  Surgeon: Laila Leiva MD;  Location: [facility];  Service: OB/GYN;  Laterality: N/A;  ECC    CERVICAL BIOPSY  W/ LOOP ELECTRODE EXCISION      CHOLECYSTECTOMY      COLPOSCOPY N/A 10/29/2018    Procedure: COLPOSCOPY;  Surgeon: Laila Leiva MD;  Location: [facility];  Service: OB/GYN;  Laterality: N/A;    CYSTOSCOPY N/A 01/07/2019    Procedure: CYSTOSCOPY;  Surgeon: Christiano Barkley MD;  Location: [facility];  Service: OB/GYN;  Laterality: N/A;    HYSTERECTOMY      HYSTEROSCOPY WITH DILATION AND CURETTAGE OF UTERUS N/A 10/29/2018    Procedure: HYSTEROSCOPY, WITH DILATION AND CURETTAGE OF UTERUS;  Surgeon: Laila Leiva MD;  Location: [facility];  Service: OB/GYN;  Laterality: N/A;    REYES CULDOPLASTY N/A 01/07/2019    Procedure: CULDOPLASTY, AMY;  Surgeon: Christiano Barkley MD;  Location: [facility];  Service: OB/GYN;  Laterality: N/A;    SALPINGECTOMY Right 01/07/2019    Procedure: SALPINGECTOMY;  Surgeon: Christaino Barkley MD;  Location: [facility];  Service: OB/GYN;  Laterality: Right;  (Partial)  FIMBRIECTOMY    TONSILLECTOMY      TUBAL LIGATION      VAGINAL HYSTERECTOMY N/A 01/07/2019    Procedure: HYSTERECTOMY, VAGINAL;  Surgeon: Christiano Barkley MD;  Location: [facility];  Service: OB/GYN;  Laterality: N/A;     Review of patient's allergies indicates:   Allergen Reactions    Oxycodone-acetaminophen     Oxycontin [oxycodone] Swelling     Current Outpatient  Medications on File Prior to Visit   Medication Sig Dispense Refill    famotidine (PEPCID) 40 MG tablet Take 40 mg by mouth.      HYDROcodone-acetaminophen (NORCO) 5-325 mg per tablet Take 1 tablet by mouth every 6 (six) hours as needed for Pain. 9 tablet 0    hydrOXYzine HCL (ATARAX) 25 MG tablet Take 1 tablet (25 mg total) by mouth 3 (three) times daily as needed for Anxiety. 30 tablet 0    ibuprofen (ADVIL,MOTRIN) 600 MG tablet Take 1 tablet (600 mg total) by mouth every 6 (six) hours as needed for Pain. 20 tablet 0    ondansetron (ZOFRAN-ODT) 4 MG TbDL Take 1 tablet (4 mg total) by mouth every 8 (eight) hours as needed (nausea). 12 tablet 0     No current facility-administered medications on file prior to visit.     Family History   Problem Relation Name Age of Onset    Breast cancer Mother      Hypertension Father      Tongue cancer Father         Medications Ordered                [id number] - [place] - [address] AT [place] [address] [address] [place] [address]    Telephone: [phone]   Fax: [phone]   Hours: Not open 24 hours                         E-Prescribed (1 of 1)              amoxicillin (AMOXIL) 500 MG capsule    Sig: Take 1 capsule (500 mg total) by mouth every 12 (twelve) hours. for 10 days       Start: 2/7/25     Quantity: 20 capsule Refills: 0                           Ohs Peq Odvv Intake    2/7/2025  6:01 AM CST - Filed by Patient   What is your current physical address in the event of a medical emergency? [address] [place] [address] [address] [place] [id number]   Are you able to take your vital signs? No   Please attach any relevant images or files    Is your employer contracted with [facility]? No         Cracked tooth. Now painful since yesterday. Swollen gums around tooth.         Constitution: Negative for fever.   HENT:  Positive for dental problem. Negative for drooling, mouth sores, tongue pain and tongue lesion.         Objective:   The physical exam was conducted  virtually.  Physical Exam   Abdominal: Normal appearance.   Neurological: She is alert.     Normal appearance    Assessment:     1. Tooth infection        Plan:       Tooth infection    Other orders  -     amoxicillin (AMOXIL) 500 MG capsule; Take 1 capsule (500 mg total) by mouth every 12 (twelve) hours. for 10 days  Dispense: 20 capsule; Refill: 0

## 2025-02-07 NOTE — PATIENT INSTRUCTIONS
Thank you for choosing [facility]!    Our goal in the [facility] to always provide outstanding medical care. You may receive a survey by mail or e-mail in the next week regarding your experience today. We would greatly appreciate you completing and returning the survey. Your feedback provides us with a way to recognize our staff who provide very good care, and it helps us learn how to improve when your experience was below our aspiration of excellence.         We appreciate you trusting us with your medical care. We hope you feel better soon. We will be happy to take care of you for all of your future medical needs.    You must understand that you've received Virtual  treatment only and that you may be released before all your medical problems are known or treated. You, the patient, will arrange for follow up care as instructed.    Follow up with your PCP or specialty clinic as directed in the next 1-2 weeks if not improved or as needed.  You can call [phone] to schedule an appointment with the appropriate provider.    If your condition worsens we recommend that you receive another evaluation in person, with your primary care provider, urgent care or at the emergency room immediately or contact your primary medical clinics after hours call service to discuss your concerns.

## 2025-02-12 ENCOUNTER — ON-DEMAND VIRTUAL (OUTPATIENT)
Dept: URGENT CARE | Facility: CLINIC | Age: 40
End: 2025-02-12
Payer: MEDICAID

## 2025-02-12 DIAGNOSIS — T36.95XA ANTIBIOTIC-INDUCED YEAST INFECTION: Primary | ICD-10-CM

## 2025-02-12 DIAGNOSIS — B37.9 ANTIBIOTIC-INDUCED YEAST INFECTION: Primary | ICD-10-CM

## 2025-02-12 RX ORDER — FLUCONAZOLE 150 MG/1
150 TABLET ORAL DAILY
Qty: 2 TABLET | Refills: 0 | Status: SHIPPED | OUTPATIENT
Start: 2025-02-12 | End: 2025-02-14

## 2025-02-12 NOTE — PROGRESS NOTES
Subjective:      Patient ID: Mima Wilkerson is a 39 y.o. female.    Vitals:  vitals were not taken for this visit.     Chief Complaint: Vaginitis      Visit Type: TELE AUDIOVISUAL - This visit was conducted virtually based on  subjective information and limited objective exam    Present with the patient at the time of consultation: TELEMED PRESENT WITH PATIENT: None  [email or web address]  Two patient identifiers used to verify patient- saying out date of birth and full name.       Past Medical History:   Diagnosis Date    Abnormal uterine bleeding (AUB)     E. coli UTI 10/28/2018    PONV (postoperative nausea and vomiting)      Past Surgical History:   Procedure Laterality Date    CERVICAL BIOPSY N/A 10/29/2018    Procedure: BIOPSY, CERVIX;  Surgeon: Laila Leiva MD;  Location: [facility];  Service: OB/GYN;  Laterality: N/A;  ECC    CERVICAL BIOPSY  W/ LOOP ELECTRODE EXCISION      CHOLECYSTECTOMY      COLPOSCOPY N/A 10/29/2018    Procedure: COLPOSCOPY;  Surgeon: Laila Leiva MD;  Location: [facility];  Service: OB/GYN;  Laterality: N/A;    CYSTOSCOPY N/A 01/07/2019    Procedure: CYSTOSCOPY;  Surgeon: Christiano Barkley MD;  Location: [facility];  Service: OB/GYN;  Laterality: N/A;    HYSTERECTOMY      HYSTEROSCOPY WITH DILATION AND CURETTAGE OF UTERUS N/A 10/29/2018    Procedure: HYSTEROSCOPY, WITH DILATION AND CURETTAGE OF UTERUS;  Surgeon: Laila Leiva MD;  Location: [facility];  Service: OB/GYN;  Laterality: N/A;    REYES CULDOPLASTY N/A 01/07/2019    Procedure: CULDOPLASTYAMY;  Surgeon: Christiano Barkley MD;  Location: [facility];  Service: OB/GYN;  Laterality: N/A;    SALPINGECTOMY Right 01/07/2019    Procedure: SALPINGECTOMY;  Surgeon: Christiano Barkley MD;  Location: [facility];  Service: OB/GYN;  Laterality: Right;  (Partial)  FIMBRIECTOMY    TONSILLECTOMY      TUBAL LIGATION      VAGINAL HYSTERECTOMY N/A 01/07/2019    Procedure: HYSTERECTOMY, VAGINAL;  Surgeon: Christiano Barkley MD;  [facility];  Service: OB/GYN;  Laterality: N/A;     Review of patient's allergies indicates:   Allergen Reactions    Oxycodone-acetaminophen     Oxycontin [oxycodone] Swelling     Current Outpatient Medications on File Prior to Visit   Medication Sig Dispense Refill    amoxicillin (AMOXIL) 500 MG capsule Take 1 capsule (500 mg total) by mouth every 12 (twelve) hours. for 10 days 20 capsule 0    famotidine (PEPCID) 40 MG tablet Take 40 mg by mouth.      HYDROcodone-acetaminophen (NORCO) 5-325 mg per tablet Take 1 tablet by mouth every 6 (six) hours as needed for Pain. 9 tablet 0    hydrOXYzine HCL (ATARAX) 25 MG tablet Take 1 tablet (25 mg total) by mouth 3 (three) times daily as needed for Anxiety. 30 tablet 0    ibuprofen (ADVIL,MOTRIN) 600 MG tablet Take 1 tablet (600 mg total) by mouth every 6 (six) hours as needed for Pain. 20 tablet 0    ondansetron (ZOFRAN-ODT) 4 MG TbDL Take 1 tablet (4 mg total) by mouth every 8 (eight) hours as needed (nausea). 12 tablet 0     No current facility-administered medications on file prior to visit.     Family History   Problem Relation Name Age of Onset    Breast cancer Mother      Hypertension Father      Tongue cancer Father         Medications Ordered                [id number] - [place] - [address] AT [place] [address] [address] [address]    Telephone: [phone]   Fax: [phone]   Hours: Not open 24 hours                         E-Prescribed (1 of 1)              fluconazole (DIFLUCAN) 150 MG Tab    Sig: Take 1 tablet (150 mg total) by mouth once daily. Take one now and may repeat in 72 h prn for 2 doses       Start: 2/12/25     Quantity: 2 tablet Refills: 0                           Ohs Peq Odvv Intake    2/12/2025  9:16 AM CST - Filed by Patient   What is your current physical address in the event of a medical emergency? [address] [place], [place] [id number]   Are you able to take your vital signs? No   Please attach any relevant images or files     Is your employer contracted with [facility]? No         38 yo female with c/o vaginal itching and burning on labia after taking antibiotics for a tooth infection. She denies discharge. Denies abdominal pain and fever.         Constitution: Negative.   HENT: Negative.     Neck: neck negative.   Cardiovascular: Negative.    Eyes: Negative.    Respiratory: Negative.     Endocrine: negative.   Genitourinary:  Positive for vaginal discharge and vaginal odor. Negative for dysuria, frequency, urgency and urine decreased.   Skin: Negative.    Allergic/Immunologic: Negative.    Neurological: Negative.    Hematologic/Lymphatic: Negative.    Psychiatric/Behavioral: Negative.          Objective:   The physical exam was conducted virtually.    AAO x 3 ; no acute distress noted; appearance normal; mood and behavior normal; thought process normal  Head- normocephalic  Nose- appears normal, no discharge or erythema  Eyes- pupils appear normal in size, no drainage, no erythema  Ears- normal appearing; no discharge, no erythema  Mouth- appears normal  Oropharynx- no erythema, lesions  Lungs- breathing at a normal rate, no acute distress noted  Heart- no reports of tachycardia, palpitations, chest pain  Abdomen- non distended, non tender reported by patient  Skin- warm and dry, no erythema or edema noted by patient or visualized  Psych- as above; no si/hi      Assessment:     1. Antibiotic-induced yeast infection        Plan:     PLEASE READ YOUR DISCHARGE INSTRUCTIONS ENTIRELY AS IT CONTAINS IMPORTANT INFORMATION.     Take one diflucan when you get it, take the other in 72 hours IF NEEDED       Try taking an over the counter probiotic or eating yogurt.     You can use over the counter clotrimazole (lotrimin) cream to the outer vagina for irritation.      Please return or see your primary care doctor if you develop new or worsening symptoms.      Please arrange follow up with your primary medical clinic as soon as  possible. You must understand that you've received an Urgent Care treatment only and that you may be released before all of your medical problems are known or treated. You, the patient, will arrange for follow up as instructed. If your symptoms worsen or fail to improve you should go to the Emergency Room.      Thank you for choosing [facility]!    Our goal in the [facility] is to always provide outstanding medical care. You may receive a survey by mail or e-mail in the next week regarding your experience today. We would greatly appreciate you completing and returning the survey. Your feedback provides us with a way to recognize our staff who provide very good care, and it helps us learn how to improve when your experience was below our aspiration of excellence.         We appreciate you trusting us with your medical care. We hope you feel better soon. We will be happy to take care of you for all of your future medical needs.    You must understand that you've received an Urgent Care treatment only and that you may be released before all your medical problems are known or treated. You, the patient, will arrange for follow up care as instructed.    Follow up with your PCP or specialty clinic as directed in the next 1-2 weeks if not improved or as needed.  You can call [phone] to schedule an appointment with the appropriate provider.    If your condition worsens we recommend that you receive another evaluation in person, with your primary care provider, urgent care or at the emergency room immediately or contact your primary medical clinics after hours call service to discuss your concerns.         Antibiotic-induced yeast infection  -     fluconazole (DIFLUCAN) 150 MG Tab; Take 1 tablet (150 mg total) by mouth once daily. Take one now and may repeat in 72 h prn for 2 doses  Dispense: 2 tablet; Refill: 0